# Patient Record
Sex: FEMALE | Race: WHITE | Employment: UNEMPLOYED | URBAN - METROPOLITAN AREA
[De-identification: names, ages, dates, MRNs, and addresses within clinical notes are randomized per-mention and may not be internally consistent; named-entity substitution may affect disease eponyms.]

---

## 2023-08-08 ENCOUNTER — HOSPITAL ENCOUNTER (EMERGENCY)
Facility: HOSPITAL | Age: 33
Discharge: HOME | End: 2023-08-08
Attending: EMERGENCY MEDICINE

## 2023-08-08 ENCOUNTER — APPOINTMENT (OUTPATIENT)
Dept: ULTRASOUND IMAGING | Facility: HOSPITAL | Age: 33
End: 2023-08-08
Attending: EMERGENCY MEDICINE

## 2023-08-08 VITALS
RESPIRATION RATE: 18 BRPM | SYSTOLIC BLOOD PRESSURE: 123 MMHG | DIASTOLIC BLOOD PRESSURE: 76 MMHG | OXYGEN SATURATION: 100 % | HEART RATE: 83 BPM | TEMPERATURE: 98 F | WEIGHT: 120 LBS

## 2023-08-08 DIAGNOSIS — O46.90 VAGINAL BLEEDING DURING PREGNANCY: ICD-10-CM

## 2023-08-08 DIAGNOSIS — R10.2 PELVIC PAIN: ICD-10-CM

## 2023-08-08 DIAGNOSIS — Z34.90 PREGNANCY, UNSPECIFIED GESTATIONAL AGE: Primary | ICD-10-CM

## 2023-08-08 LAB
B-HCG SERPL-ACNC: ABNORMAL MIU/ML
BASOPHILS # BLD AUTO: 0.06 X10(3) UL (ref 0–0.2)
BASOPHILS NFR BLD AUTO: 0.5 %
EOSINOPHIL # BLD AUTO: 0.02 X10(3) UL (ref 0–0.7)
EOSINOPHIL NFR BLD AUTO: 0.2 %
ERYTHROCYTE [DISTWIDTH] IN BLOOD BY AUTOMATED COUNT: 12.1 %
HCT VFR BLD AUTO: 39.9 %
HGB BLD-MCNC: 14 G/DL
IMM GRANULOCYTES # BLD AUTO: 0.04 X10(3) UL (ref 0–1)
IMM GRANULOCYTES NFR BLD: 0.3 %
LYMPHOCYTES # BLD AUTO: 2.73 X10(3) UL (ref 1–4)
LYMPHOCYTES NFR BLD AUTO: 23.8 %
MCH RBC QN AUTO: 31.3 PG (ref 26–34)
MCHC RBC AUTO-ENTMCNC: 35.1 G/DL (ref 31–37)
MCV RBC AUTO: 89.1 FL
MONOCYTES # BLD AUTO: 0.64 X10(3) UL (ref 0.1–1)
MONOCYTES NFR BLD AUTO: 5.6 %
NEUTROPHILS # BLD AUTO: 7.99 X10 (3) UL (ref 1.5–7.7)
NEUTROPHILS # BLD AUTO: 7.99 X10(3) UL (ref 1.5–7.7)
NEUTROPHILS NFR BLD AUTO: 69.6 %
PLATELET # BLD AUTO: 294 10(3)UL (ref 150–450)
RBC # BLD AUTO: 4.48 X10(6)UL
RH BLOOD TYPE: POSITIVE
WBC # BLD AUTO: 11.5 X10(3) UL (ref 4–11)

## 2023-08-08 PROCEDURE — 96360 HYDRATION IV INFUSION INIT: CPT

## 2023-08-08 PROCEDURE — 76801 OB US < 14 WKS SINGLE FETUS: CPT | Performed by: EMERGENCY MEDICINE

## 2023-08-08 PROCEDURE — 84702 CHORIONIC GONADOTROPIN TEST: CPT | Performed by: EMERGENCY MEDICINE

## 2023-08-08 PROCEDURE — 85025 COMPLETE CBC W/AUTO DIFF WBC: CPT | Performed by: EMERGENCY MEDICINE

## 2023-08-08 PROCEDURE — 96361 HYDRATE IV INFUSION ADD-ON: CPT

## 2023-08-08 PROCEDURE — 76817 TRANSVAGINAL US OBSTETRIC: CPT | Performed by: EMERGENCY MEDICINE

## 2023-08-08 PROCEDURE — 86901 BLOOD TYPING SEROLOGIC RH(D): CPT | Performed by: EMERGENCY MEDICINE

## 2023-08-08 PROCEDURE — 99284 EMERGENCY DEPT VISIT MOD MDM: CPT

## 2023-08-08 PROCEDURE — 86900 BLOOD TYPING SEROLOGIC ABO: CPT | Performed by: EMERGENCY MEDICINE

## 2023-08-08 NOTE — ED INITIAL ASSESSMENT (HPI)
Pt to ED for strong abdominal pain that started yesterday with bleeding. LMP June 24th. Pt found out she was pregnant 15 days ago at a clinic. Pt stated the bleeding is spotting and strong pain in abdomen. Gyne MD recommended to take medication, macrobid.

## (undated) NOTE — LETTER
Date & Time: 8/8/2023, 12:30 PM  Patient: Monique Merida  Encounter Provider(s):    Marques Andres MD       To Whom It May Concern:    Wilfred Houston was seen and treated in our department on 8/8/2023. She can return to work Wednesday, 8/9/2023.      If you have any questions or concerns, please do not hesitate to call.        _____________________________  Physician/[facility] Signature